# Patient Record
Sex: FEMALE | HISPANIC OR LATINO | Employment: FULL TIME | URBAN - METROPOLITAN AREA
[De-identification: names, ages, dates, MRNs, and addresses within clinical notes are randomized per-mention and may not be internally consistent; named-entity substitution may affect disease eponyms.]

---

## 2017-09-14 ENCOUNTER — OFFICE VISIT (OUTPATIENT)
Dept: OBSTETRICS AND GYNECOLOGY | Facility: CLINIC | Age: 47
End: 2017-09-14

## 2017-09-14 VITALS
BODY MASS INDEX: 23.73 KG/M2 | SYSTOLIC BLOOD PRESSURE: 118 MMHG | WEIGHT: 139 LBS | DIASTOLIC BLOOD PRESSURE: 78 MMHG | HEIGHT: 64 IN

## 2017-09-14 DIAGNOSIS — Z00.00 ENCOUNTER FOR ANNUAL GENERAL MEDICAL EXAMINATION WITHOUT ABNORMAL FINDINGS IN ADULT: Primary | ICD-10-CM

## 2017-09-14 LAB
BILIRUB BLD-MCNC: NEGATIVE MG/DL
CLARITY, POC: CLEAR
COLOR UR: YELLOW
GLUCOSE UR STRIP-MCNC: NEGATIVE MG/DL
KETONES UR QL: NEGATIVE
LEUKOCYTE EST, POC: NEGATIVE
NITRITE UR-MCNC: NEGATIVE MG/ML
PH UR: 5 [PH] (ref 5–8)
PROT UR STRIP-MCNC: NEGATIVE MG/DL
RBC # UR STRIP: NEGATIVE /UL
SP GR UR: 1.02 (ref 1–1.03)
UROBILINOGEN UR QL: NORMAL

## 2017-09-14 PROCEDURE — 99396 PREV VISIT EST AGE 40-64: CPT | Performed by: OBSTETRICS & GYNECOLOGY

## 2017-09-14 PROCEDURE — 81002 URINALYSIS NONAUTO W/O SCOPE: CPT | Performed by: OBSTETRICS & GYNECOLOGY

## 2017-09-14 NOTE — PROGRESS NOTES
GYN Annual Exam     CC- Here for annual exam.     Naomy Cagle is a 47 y.o. female who presents for annual well woman exam. S/p endometrial ablation- having very light spotting. Has some cramping 7-10 days.  Patient is sexually active  yes -  . Patient is satisfied with her contraception yes - vasectomy.     OB History      Para Term  AB TAB SAB Ectopic Multiple Living    3 3 3                 Current contraception: vasectomy  History of abnormal Pap smear: no  History of abnormal mammogram: no  Family history of uterine, colon or ovarian cancer: no  Family history of breast cancer: no    Health Maintenance   Topic Date Due   • TDAP/TD VACCINES (1 - Tdap) 1991   • INFLUENZA VACCINE  2017   • LIPID PANEL  2017   • PAP SMEAR  2017       Past Medical History:   Diagnosis Date   • Anxiety    • Elevated cholesterol    • Hypertension        Past Surgical History:   Procedure Laterality Date   • CHOLECYSTECTOMY     • FOOT SURGERY Bilateral          Current Outpatient Prescriptions:   •  Amphetamine-Dextroamphetamine (ADDERALL PO), Take  by mouth., Disp: , Rfl:   •  Atorvastatin Calcium (LIPITOR PO), Take  by mouth., Disp: , Rfl:   •  Losartan Potassium-HCTZ (HYZAAR PO), Take  by mouth., Disp: , Rfl:   •  sertraline (ZOLOFT) 50 MG tablet, Take 50 mg by mouth Daily., Disp: , Rfl:     Allergies   Allergen Reactions   • Sulfa Antibiotics    • Erythromycin Rash       Social History   Substance Use Topics   • Smoking status: Never Smoker   • Smokeless tobacco: None   • Alcohol use Yes      Comment: occasional        Family History   Problem Relation Age of Onset   • Breast cancer Maternal Grandmother    • Breast cancer Paternal Aunt    • No Known Problems Mother    • No Known Problems Father        Review of Systems   Constitutional: Negative for unexpected weight change.   Gastrointestinal: Negative for constipation and diarrhea.   Genitourinary: Negative for dyspareunia,  "menstrual problem, pelvic pain and vaginal bleeding.       /78  Ht 64\" (162.6 cm)  Wt 139 lb (63 kg)  BMI 23.86 kg/m2    Physical Exam   Constitutional: She is oriented to person, place, and time. She appears well-developed and well-nourished.   HENT:   Mouth/Throat: Normal dentition. No dental caries.   Cardiovascular: Normal rate and regular rhythm.    Pulmonary/Chest: Effort normal and breath sounds normal. Right breast exhibits no inverted nipple, no mass, no nipple discharge, no skin change and no tenderness. Left breast exhibits no inverted nipple, no mass, no nipple discharge, no skin change and no tenderness.   Abdominal: Soft. She exhibits no distension and no mass. There is no tenderness.   Genitourinary: There is no rash, tenderness or lesion on the right labia. There is no rash, tenderness or lesion on the left labia. Uterus is not deviated, not enlarged, not fixed and not tender. Cervix exhibits no motion tenderness, no discharge and no friability. Right adnexum displays no mass, no tenderness and no fullness. Left adnexum displays no mass, no tenderness and no fullness. No tenderness or bleeding in the vagina. No vaginal discharge found.   Neurological: She is alert and oriented to person, place, and time.   Psychiatric: She has a normal mood and affect. Her behavior is normal. Judgment and thought content normal.   Vitals reviewed.         Assessment/Plan    1) GYN HM: Check pap smear.  Check MMG. SBE demonstrated and encouraged.  2) STD screening: declines  3) Contraception: Vasectomy  4) Family Planning: Not interested in more children.  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  8) Follow up prn and 1 year       Diagnoses and all orders for this visit:    Encounter for annual general medical examination without abnormal findings in adult  -     POC Urinalysis Dipstick  -     Pap IG, HPV-hr  -    "  Mammo Screening Bilateral With CAD; Future          Kendy Cowart,   9/15/2017  9:20 PM

## 2017-09-18 LAB
CYTOLOGIST CVX/VAG CYTO: NORMAL
CYTOLOGY CVX/VAG DOC THIN PREP: NORMAL
DX ICD CODE: NORMAL
HIV 1 & 2 AB SER-IMP: NORMAL
HPV I/H RISK 1 DNA CVX QL PROBE+SIG AMP: NEGATIVE
OTHER STN SPEC: NORMAL
PATH REPORT.FINAL DX SPEC: NORMAL
STAT OF ADQ CVX/VAG CYTO-IMP: NORMAL

## 2017-09-29 ENCOUNTER — HOSPITAL ENCOUNTER (OUTPATIENT)
Dept: MAMMOGRAPHY | Facility: HOSPITAL | Age: 47
Discharge: HOME | End: 2017-09-29
Attending: OBSTETRICS & GYNECOLOGY | Admitting: OBSTETRICS & GYNECOLOGY

## 2017-09-29 DIAGNOSIS — Z00.00 ENCOUNTER FOR ANNUAL GENERAL MEDICAL EXAMINATION WITHOUT ABNORMAL FINDINGS IN ADULT: ICD-10-CM

## 2017-09-29 PROCEDURE — 77063 BREAST TOMOSYNTHESIS BI: CPT

## 2017-09-29 PROCEDURE — G0202 SCR MAMMO BI INCL CAD: HCPCS

## 2017-10-02 DIAGNOSIS — R92.8 ABNORMAL MAMMOGRAM OF LEFT BREAST: Primary | ICD-10-CM

## 2017-10-09 ENCOUNTER — HOSPITAL ENCOUNTER (OUTPATIENT)
Dept: MAMMOGRAPHY | Facility: HOSPITAL | Age: 47
Discharge: HOME | End: 2017-10-09
Attending: OBSTETRICS & GYNECOLOGY | Admitting: OBSTETRICS & GYNECOLOGY

## 2017-10-09 ENCOUNTER — APPOINTMENT (OUTPATIENT)
Dept: MAMMOGRAPHY | Facility: HOSPITAL | Age: 47
End: 2017-10-09
Attending: OBSTETRICS & GYNECOLOGY

## 2017-10-09 ENCOUNTER — APPOINTMENT (OUTPATIENT)
Dept: ULTRASOUND IMAGING | Facility: HOSPITAL | Age: 47
End: 2017-10-09
Attending: OBSTETRICS & GYNECOLOGY

## 2017-10-09 DIAGNOSIS — R92.8 ABNORMAL MAMMOGRAM OF LEFT BREAST: ICD-10-CM

## 2017-10-09 PROCEDURE — G0279 TOMOSYNTHESIS, MAMMO: HCPCS

## 2017-10-09 PROCEDURE — G0206 DX MAMMO INCL CAD UNI: HCPCS

## 2018-09-05 ENCOUNTER — TELEPHONE (OUTPATIENT)
Dept: OBSTETRICS AND GYNECOLOGY | Facility: CLINIC | Age: 48
End: 2018-09-05

## 2018-09-05 NOTE — TELEPHONE ENCOUNTER
She is having increased pain in her abdominal area. She is scheduled for her annual Oct 25th, do you want me to put her on the US schedule prior to her appt?

## 2018-10-24 ENCOUNTER — TELEPHONE (OUTPATIENT)
Dept: OBSTETRICS AND GYNECOLOGY | Facility: CLINIC | Age: 48
End: 2018-10-24

## 2018-10-24 NOTE — TELEPHONE ENCOUNTER
Pt is on your schedule for tomorrow for an annual and abd pain. Do you want me to add her to the US schedule?

## 2019-01-25 ENCOUNTER — OFFICE VISIT (OUTPATIENT)
Dept: OBSTETRICS AND GYNECOLOGY | Facility: CLINIC | Age: 49
End: 2019-01-25

## 2019-01-25 VITALS
SYSTOLIC BLOOD PRESSURE: 116 MMHG | DIASTOLIC BLOOD PRESSURE: 78 MMHG | HEIGHT: 64 IN | WEIGHT: 144 LBS | BODY MASS INDEX: 24.59 KG/M2

## 2019-01-25 DIAGNOSIS — Z11.51 SPECIAL SCREENING EXAMINATION FOR HUMAN PAPILLOMAVIRUS (HPV): ICD-10-CM

## 2019-01-25 DIAGNOSIS — Z01.419 WELL FEMALE EXAM WITH ROUTINE GYNECOLOGICAL EXAM: Primary | ICD-10-CM

## 2019-01-25 LAB
B-HCG UR QL: NEGATIVE
BILIRUB BLD-MCNC: NEGATIVE MG/DL
CLARITY, POC: CLEAR
COLOR UR: YELLOW
GLUCOSE UR STRIP-MCNC: NEGATIVE MG/DL
INTERNAL NEGATIVE CONTROL: NEGATIVE
INTERNAL POSITIVE CONTROL: POSITIVE
KETONES UR QL: NEGATIVE
LEUKOCYTE EST, POC: NEGATIVE
Lab: NORMAL
NITRITE UR-MCNC: NEGATIVE MG/ML
PH UR: 6 [PH] (ref 5–8)
PROT UR STRIP-MCNC: NEGATIVE MG/DL
RBC # UR STRIP: NEGATIVE /UL
SP GR UR: 1.02 (ref 1–1.03)
UROBILINOGEN UR QL: NORMAL

## 2019-01-25 PROCEDURE — 81002 URINALYSIS NONAUTO W/O SCOPE: CPT | Performed by: OBSTETRICS & GYNECOLOGY

## 2019-01-25 PROCEDURE — 99213 OFFICE O/P EST LOW 20 MIN: CPT | Performed by: OBSTETRICS & GYNECOLOGY

## 2019-01-25 PROCEDURE — 81025 URINE PREGNANCY TEST: CPT | Performed by: OBSTETRICS & GYNECOLOGY

## 2019-01-25 PROCEDURE — 99396 PREV VISIT EST AGE 40-64: CPT | Performed by: OBSTETRICS & GYNECOLOGY

## 2019-01-25 RX ORDER — LOSARTAN POTASSIUM AND HYDROCHLOROTHIAZIDE 12.5; 1 MG/1; MG/1
TABLET ORAL
COMMUNITY
Start: 2018-10-19

## 2019-01-25 RX ORDER — DEXTROAMPHETAMINE SACCHARATE, AMPHETAMINE ASPARTATE MONOHYDRATE, DEXTROAMPHETAMINE SULFATE AND AMPHETAMINE SULFATE 7.5; 7.5; 7.5; 7.5 MG/1; MG/1; MG/1; MG/1
CAPSULE, EXTENDED RELEASE ORAL
Refills: 0 | COMMUNITY
Start: 2019-01-02

## 2019-01-25 NOTE — PROGRESS NOTES
GYN Annual Exam     CC- Here for annual exam.     Naomy Cagle is a 48 y.o. female who presents for annual well woman exam. Pt has a hx of an endometrial ablation 'years' ago. Pt reports that she is having 7-9 days of debilitating pain. Pt describes the pain as cramping and pressure that is really bothering her. She states the symptoms are cyclical and match up to when she is supposed to be on her cycle. Pt does not have any bleeding. Reporting some hot flashes and difficulty sleeping.     OB History      Para Term  AB Living    3 3 3          SAB TAB Ectopic Molar Multiple Live Births                         Current contraception: vasectomy  History of abnormal Pap smear: no  History of abnormal mammogram: no  Family history of uterine, colon or ovarian cancer: no  Family history of breast cancer: no    Health Maintenance   Topic Date Due   • TDAP/TD VACCINES (1 - Tdap) 1991   • LIPID PANEL  2017   • INFLUENZA VACCINE  2018   • ANNUAL PHYSICAL  09/15/2018   • PAP SMEAR  2020       Past Medical History:   Diagnosis Date   • Anxiety    • Elevated cholesterol    • Hypertension        Past Surgical History:   Procedure Laterality Date   • CHOLECYSTECTOMY     • FOOT SURGERY Bilateral          Current Outpatient Medications:   •  amphetamine-dextroamphetamine XR (ADDERALL XR) 30 MG 24 hr capsule, , Disp: , Rfl: 0  •  Atorvastatin Calcium (LIPITOR PO), Take  by mouth., Disp: , Rfl:   •  Losartan Potassium-HCTZ (HYZAAR PO), Take  by mouth., Disp: , Rfl:   •  losartan-hydrochlorothiazide (HYZAAR) 100-12.5 MG per tablet, , Disp: , Rfl:   •  sertraline (ZOLOFT) 50 MG tablet, Take 50 mg by mouth Daily., Disp: , Rfl:     Allergies   Allergen Reactions   • Sulfa Antibiotics    • Erythromycin Rash       Social History     Tobacco Use   • Smoking status: Never Smoker   Substance Use Topics   • Alcohol use: Yes     Comment: occasional    • Drug use: Defer       Family History   Problem  "Relation Age of Onset   • Breast cancer Maternal Grandmother    • Breast cancer Paternal Aunt    • No Known Problems Mother    • No Known Problems Father        Review of Systems   Constitutional: Negative for unexpected weight change.   Gastrointestinal: Negative for abdominal distention, abdominal pain, anal bleeding, blood in stool, constipation and diarrhea.   Genitourinary: Positive for menstrual problem and pelvic pain. Negative for dyspareunia, vaginal bleeding, vaginal discharge and vaginal pain.       /78   Ht 162.6 cm (64.02\")   Wt 65.3 kg (144 lb)   BMI 24.71 kg/m²     Physical Exam   Constitutional: She is oriented to person, place, and time. She appears well-developed and well-nourished.   HENT:   Mouth/Throat: Normal dentition. No dental caries.   Cardiovascular: Normal rate and regular rhythm.   Pulmonary/Chest: Effort normal and breath sounds normal. Right breast exhibits no inverted nipple, no mass, no nipple discharge, no skin change and no tenderness. Left breast exhibits no inverted nipple, no mass, no nipple discharge, no skin change and no tenderness.   Abdominal: Soft. She exhibits no distension and no mass. There is no tenderness.   Genitourinary: There is no rash, tenderness or lesion on the right labia. There is no rash, tenderness or lesion on the left labia. Uterus is not deviated, not enlarged, not fixed and not tender. Cervix exhibits no motion tenderness, no discharge and no friability. Right adnexum displays no mass, no tenderness and no fullness. Left adnexum displays no mass, no tenderness and no fullness. No tenderness or bleeding in the vagina. No vaginal discharge found.   Neurological: She is alert and oriented to person, place, and time.   Psychiatric: She has a normal mood and affect. Her behavior is normal. Judgment and thought content normal.   Vitals reviewed.         Assessment/Plan      1) GYN HM: LPS 9/2017 normal w neg HR HPV. Check pap smear. MMG. SBE " demonstrated and encouraged.  2) Cyclical pelvic pain: hx of endometrial ablation. Debilitating pain 7-9 days per month. No VB associated with these episodes. Discussed a trial of Lo loestrin to see if this helps with her pain. PE unremarkable. Pt will fu in 2 months. If pain is not improved/resolved then will proceed with TVUS.  3) Hot flashes/difficulty sleeping: ? Perimenopause. Starting Lo loestrin to help with her pelvic pain. Discussed that the med may help with her hot flashes and sleeping as well. Will fu in 2 months to evaluate sx.  4) Contraception: Vasectomy  5) Family Planning: Not interested in more children.  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   9) Follow up 2 months        Diagnoses and all orders for this visit:    Well female exam with routine gynecological exam  -     POC Urinalysis Dipstick  -     POC Pregnancy, Urine  -     Pap IG, HPV-hr  -     Mammo Screening Bilateral With CAD; Future    Special screening examination for human papillomavirus (HPV)  -     Pap IG, HPV-hr    Other orders  -     amphetamine-dextroamphetamine XR (ADDERALL XR) 30 MG 24 hr capsule; Earliest Fill Date: 1/2/19   -     losartan-hydrochlorothiazide (HYZAAR) 100-12.5 MG per tablet;           Kendy Cowart DO  1/25/2019  8:45 AM

## 2019-01-30 ENCOUNTER — APPOINTMENT (OUTPATIENT)
Dept: MAMMOGRAPHY | Facility: HOSPITAL | Age: 49
End: 2019-01-30
Attending: OBSTETRICS & GYNECOLOGY

## 2019-02-06 ENCOUNTER — APPOINTMENT (OUTPATIENT)
Dept: MAMMOGRAPHY | Facility: HOSPITAL | Age: 49
End: 2019-02-06
Attending: OBSTETRICS & GYNECOLOGY

## 2019-02-15 ENCOUNTER — HOSPITAL ENCOUNTER (OUTPATIENT)
Dept: MAMMOGRAPHY | Facility: HOSPITAL | Age: 49
Discharge: HOME | End: 2019-02-15
Attending: OBSTETRICS & GYNECOLOGY | Admitting: OBSTETRICS & GYNECOLOGY

## 2019-02-15 DIAGNOSIS — Z01.419 WELL FEMALE EXAM WITH ROUTINE GYNECOLOGICAL EXAM: ICD-10-CM

## 2019-02-15 PROCEDURE — 77067 SCR MAMMO BI INCL CAD: CPT

## 2019-02-15 PROCEDURE — 77063 BREAST TOMOSYNTHESIS BI: CPT

## 2021-05-14 ENCOUNTER — IMMUNIZATION (OUTPATIENT)
Dept: VACCINE CLINIC | Facility: HOSPITAL | Age: 51
End: 2021-05-14

## 2021-05-14 PROCEDURE — 0001A: CPT | Performed by: OBSTETRICS & GYNECOLOGY

## 2021-05-14 PROCEDURE — 91300 HC SARSCOV02 VAC 30MCG/0.3ML IM: CPT | Performed by: OBSTETRICS & GYNECOLOGY

## 2021-06-04 ENCOUNTER — APPOINTMENT (OUTPATIENT)
Dept: VACCINE CLINIC | Facility: HOSPITAL | Age: 51
End: 2021-06-04

## 2021-06-10 ENCOUNTER — IMMUNIZATION (OUTPATIENT)
Dept: VACCINE CLINIC | Facility: HOSPITAL | Age: 51
End: 2021-06-10

## 2021-06-10 PROCEDURE — 91300 HC SARSCOV02 VAC 30MCG/0.3ML IM: CPT | Performed by: INTERNAL MEDICINE

## 2021-06-10 PROCEDURE — 0002A: CPT | Performed by: INTERNAL MEDICINE

## 2021-07-02 ENCOUNTER — TRANSCRIBE ORDERS (OUTPATIENT)
Dept: ADMINISTRATIVE | Facility: HOSPITAL | Age: 51
End: 2021-07-02

## 2021-07-02 DIAGNOSIS — Z92.89 HISTORY OF MAMMOGRAPHY, SCREENING: Primary | ICD-10-CM

## 2021-07-23 ENCOUNTER — APPOINTMENT (OUTPATIENT)
Dept: MAMMOGRAPHY | Facility: HOSPITAL | Age: 51
End: 2021-07-23

## 2022-12-22 ENCOUNTER — OFFICE VISIT (OUTPATIENT)
Dept: OBSTETRICS AND GYNECOLOGY | Facility: CLINIC | Age: 52
End: 2022-12-22

## 2022-12-22 VITALS
HEIGHT: 64 IN | WEIGHT: 129 LBS | BODY MASS INDEX: 22.02 KG/M2 | SYSTOLIC BLOOD PRESSURE: 120 MMHG | DIASTOLIC BLOOD PRESSURE: 72 MMHG

## 2022-12-22 DIAGNOSIS — Z01.419 ROUTINE GYNECOLOGICAL EXAMINATION: Primary | ICD-10-CM

## 2022-12-22 DIAGNOSIS — Z11.51 SPECIAL SCREENING EXAMINATION FOR HUMAN PAPILLOMAVIRUS (HPV): ICD-10-CM

## 2022-12-22 DIAGNOSIS — Z01.419 PAP SMEAR, LOW-RISK: ICD-10-CM

## 2022-12-22 PROCEDURE — 99386 PREV VISIT NEW AGE 40-64: CPT | Performed by: OBSTETRICS & GYNECOLOGY

## 2022-12-22 PROCEDURE — 81002 URINALYSIS NONAUTO W/O SCOPE: CPT | Performed by: OBSTETRICS & GYNECOLOGY

## 2022-12-22 RX ORDER — DICYCLOMINE HYDROCHLORIDE 10 MG/1
CAPSULE ORAL
COMMUNITY
Start: 2022-12-05

## 2022-12-22 NOTE — PROGRESS NOTES
GYN Annual Exam     CC- Here for annual exam.     Naomy Cagle is a 52 y.o. female who presents for annual well woman exam. Pt is considered a new pt bc she has not been seen in over 3 years. Last was in 2019. Pt has a hx of an endometrial ablation 'years' ago. At last visit she was reporting 7-9 days of debilitating pain that was cyclical. She states the symptoms have improved. She is having some intermittent vasomotor symptoms. Pt is reporting some mood swings and difficulty with sleep.    OB History        3    Para   3    Term   3            AB        Living           SAB        IAB        Ectopic        Molar        Multiple        Live Births                    Current contraception: vasectomy  History of abnormal Pap smear: no  History of abnormal mammogram: no  Family history of uterine, colon or ovarian cancer: no  Family history of breast cancer: no    Health Maintenance   Topic Date Due   • COLORECTAL CANCER SCREENING  Never done   • HEPATITIS C SCREENING  Never done   • ANNUAL PHYSICAL  2018   • Annual Gynecologic Pelvic and Breast Exam  2020   • COVID-19 Vaccine (3 - Booster for Pfizer series) 2021   • PAP SMEAR  2022   • ZOSTER VACCINE (1 of 2) Never done   • INFLUENZA VACCINE  2022   • LIPID PANEL  2023   • MAMMOGRAM  2024   • TDAP/TD VACCINES (2 - Td or Tdap) 2029   • Pneumococcal Vaccine 0-64  Aged Out       Past Medical History:   Diagnosis Date   • Anxiety    • Elevated cholesterol    • Hypertension        Past Surgical History:   Procedure Laterality Date   • CHOLECYSTECTOMY     • FOOT SURGERY Bilateral          Current Outpatient Medications:   •  amphetamine-dextroamphetamine XR (ADDERALL XR) 30 MG 24 hr capsule, , Disp: , Rfl: 0  •  Atorvastatin Calcium (LIPITOR PO), Take  by mouth., Disp: , Rfl:   •  CBD (cannabidiol) oral oil, Take  by mouth., Disp: , Rfl:   •  Multiple Vitamins-Minerals (Multivitamin Adult, Minerals,) tablet,  "Take 1 tablet by mouth Daily., Disp: , Rfl:   •  sertraline (ZOLOFT) 50 MG tablet, Take 50 mg by mouth Daily., Disp: , Rfl:   •  dicyclomine (BENTYL) 10 MG capsule, TAKE 1 CAPSULE BY MOUTH FOUR TIMES DAILY BEFORE MEALS AND AT NIGHT, Disp: , Rfl:   •  Losartan Potassium-HCTZ (HYZAAR PO), Take  by mouth., Disp: , Rfl:   •  losartan-hydrochlorothiazide (HYZAAR) 100-12.5 MG per tablet, , Disp: , Rfl:     Allergies   Allergen Reactions   • Erythromycin Rash   • Sulfa Antibiotics Rash       Social History     Tobacco Use   • Smoking status: Never   Substance Use Topics   • Alcohol use: Yes     Comment: occasional    • Drug use: Defer       Family History   Problem Relation Age of Onset   • Breast cancer Maternal Grandmother    • Breast cancer Paternal Aunt    • No Known Problems Mother    • No Known Problems Father        Review of Systems   Constitutional: Negative for unexpected weight change.   Gastrointestinal: Negative for abdominal distention, abdominal pain, anal bleeding, blood in stool, constipation and diarrhea.   Genitourinary: Negative for dyspareunia, menstrual problem, pelvic pain, vaginal bleeding, vaginal discharge and vaginal pain.       /72   Ht 162.6 cm (64\")   Wt 58.5 kg (129 lb)   BMI 22.14 kg/m²     Physical Exam   Constitutional: She is oriented to person, place, and time. She appears well-developed.   HENT:   Mouth/Throat: Normal dentition. No dental caries.   Cardiovascular: Normal rate and regular rhythm.   Pulmonary/Chest: Effort normal and breath sounds normal. Right breast exhibits no inverted nipple, no mass, no nipple discharge, no skin change and no tenderness. Left breast exhibits no inverted nipple, no mass, no nipple discharge, no skin change and no tenderness.   Abdominal: Soft. She exhibits no distension and no mass. There is no abdominal tenderness.   Genitourinary: There is no rash, tenderness or lesion on the right labia. There is no rash, tenderness or lesion on the left " labia. Uterus is not deviated, not enlarged, not fixed and not tender. Cervix exhibits no motion tenderness, no discharge and no friability. Right adnexum displays no mass, no tenderness and no fullness. Left adnexum displays no mass, no tenderness and no fullness.    No vaginal discharge, tenderness or bleeding.   No tenderness or bleeding in the vagina.   Neurological: She is alert and oriented to person, place, and time.   Psychiatric: Her behavior is normal. Judgment and thought content normal.   Vitals reviewed.         Assessment/Plan      1) GYN HM: LPS 2019 normal w neg HR HPV. Check pap smear. MMG 6/2022. SBE demonstrated and encouraged.  2) Suspected perimenopause: Pt declines meds but would consider Prometrium if symptoms worsen.  4) Contraception: Vasectomy  5) Family Planning: Not interested in more children.  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   9) Follow up 1 year/prn        Diagnoses and all orders for this visit:    Routine gynecological examination  -     POC Urinalysis Dipstick  -     IGP, Apt HPV,rfx 16 / 18,45    Special screening examination for human papillomavirus (HPV)  -     IGP, Apt HPV,rfx 16 / 18,45    Pap smear, low-risk  -     IGP, Apt HPV,rfx 16 / 18,45    Other orders  -     dicyclomine (BENTYL) 10 MG capsule; TAKE 1 CAPSULE BY MOUTH FOUR TIMES DAILY BEFORE MEALS AND AT NIGHT  -     Multiple Vitamins-Minerals (Multivitamin Adult, Minerals,) tablet; Take 1 tablet by mouth Daily.  -     CBD (cannabidiol) oral oil; Take  by mouth.          Kendy Cowart,   12/22/2022  13:04 EST

## 2023-01-03 LAB
CYTOLOGIST CVX/VAG CYTO: NORMAL
CYTOLOGY CVX/VAG DOC CYTO: NORMAL
CYTOLOGY CVX/VAG DOC THIN PREP: NORMAL
DX ICD CODE: NORMAL
HIV 1 & 2 AB SER-IMP: NORMAL
HPV I/H RISK 4 DNA CVX QL PROBE+SIG AMP: NEGATIVE
OTHER STN SPEC: NORMAL
STAT OF ADQ CVX/VAG CYTO-IMP: NORMAL

## 2024-01-04 ENCOUNTER — TELEPHONE (OUTPATIENT)
Dept: OBSTETRICS AND GYNECOLOGY | Facility: CLINIC | Age: 54
End: 2024-01-04

## 2024-01-04 NOTE — TELEPHONE ENCOUNTER
PROVIDER: ARIA BARRIENTOS    CALLER: OMID SHAIKH    SAME DAY CANCEL, DAUGHTER TESTED POSITIVE FOR COVID YESTERDAY AND PT IS STARTING TO HAVE SYMPTOMS

## 2024-04-30 ENCOUNTER — TELEPHONE (OUTPATIENT)
Dept: OBSTETRICS AND GYNECOLOGY | Facility: CLINIC | Age: 54
End: 2024-04-30
Payer: COMMERCIAL

## 2024-04-30 NOTE — TELEPHONE ENCOUNTER
Called patient to see if she would like to r/s her canceled appointment from 5/3. Patient did not answer, M 4/30

## 2024-06-10 ENCOUNTER — OFFICE VISIT (OUTPATIENT)
Dept: OBSTETRICS AND GYNECOLOGY | Facility: CLINIC | Age: 54
End: 2024-06-10
Payer: COMMERCIAL

## 2024-06-10 VITALS
WEIGHT: 137 LBS | SYSTOLIC BLOOD PRESSURE: 130 MMHG | DIASTOLIC BLOOD PRESSURE: 82 MMHG | BODY MASS INDEX: 23.39 KG/M2 | HEIGHT: 64 IN

## 2024-06-10 DIAGNOSIS — Z12.31 ENCOUNTER FOR SCREENING MAMMOGRAM FOR MALIGNANT NEOPLASM OF BREAST: ICD-10-CM

## 2024-06-10 DIAGNOSIS — Z01.419 CERVICAL SMEAR, AS PART OF ROUTINE GYNECOLOGICAL EXAMINATION: Primary | ICD-10-CM

## 2024-06-10 DIAGNOSIS — Z01.419 ROUTINE GYNECOLOGICAL EXAMINATION: ICD-10-CM

## 2024-06-10 DIAGNOSIS — Z11.51 SPECIAL SCREENING EXAMINATION FOR HUMAN PAPILLOMAVIRUS (HPV): ICD-10-CM

## 2024-06-10 LAB
BILIRUB BLD-MCNC: NEGATIVE MG/DL
CLARITY, POC: CLEAR
COLOR UR: YELLOW
GLUCOSE UR STRIP-MCNC: NEGATIVE MG/DL
KETONES UR QL: NEGATIVE
LEUKOCYTE EST, POC: NEGATIVE
NITRITE UR-MCNC: NEGATIVE MG/ML
PH UR: 5 [PH] (ref 5–8)
PROT UR STRIP-MCNC: NEGATIVE MG/DL
RBC # UR STRIP: NEGATIVE /UL
SP GR UR: 1 (ref 1–1.03)
UROBILINOGEN UR QL: NORMAL

## 2024-06-10 PROCEDURE — 99396 PREV VISIT EST AGE 40-64: CPT | Performed by: NURSE PRACTITIONER

## 2024-06-10 PROCEDURE — 81002 URINALYSIS NONAUTO W/O SCOPE: CPT | Performed by: NURSE PRACTITIONER

## 2024-06-10 RX ORDER — ATORVASTATIN CALCIUM 20 MG/1
1 TABLET, FILM COATED ORAL DAILY
COMMUNITY
Start: 2024-05-06

## 2024-06-10 RX ORDER — POLYETHYLENE GLYCOL 3350, SODIUM CHLORIDE, SODIUM BICARBONATE, POTASSIUM CHLORIDE 420; 11.2; 5.72; 1.48 G/4L; G/4L; G/4L; G/4L
POWDER, FOR SOLUTION ORAL
COMMUNITY
Start: 2024-05-09

## 2024-06-10 NOTE — PROGRESS NOTES
"GYN Annual Exam     CC- Here for annual exam.     Naomy Cagle is a 54 y.o. female who presents for annual well woman exam. She is an established patient. Pt has a hx of an endometrial ablation 'years' ago.  She has not had any cycles since her ablation. She has hot flashes for approx 3-4 months. Reports right now her hot flashes are manageable at this time. She also c/o weight gain. She states \" I have gained 10# since February or March or something like that.\"     She does not do SBE. She is sexually active. She also c/o decreased libido- pt has not desires to have sex. She is happy in her relationship. She denies pain with sex.       OB History          3    Para   3    Term   3            AB        Living             SAB        IAB        Ectopic        Molar        Multiple        Live Births                  Last pap:  NIL   MM/22  Colonoscopy: upcoming  Current contraception: vasectomy  History of abnormal Pap smear: no  History of abnormal mammogram: no  Family history of uterine, colon or ovarian cancer: no  Family history of breast cancer: no    Health Maintenance   Topic Date Due    COLORECTAL CANCER SCREENING  Never done    HEPATITIS C SCREENING  Never done    ANNUAL PHYSICAL  2018    ZOSTER VACCINE (1 of 2) Never done    COVID-19 Vaccine (3 - 2023-24 season) 2023    Annual Gynecologic Pelvic and Breast Exam  2023    MAMMOGRAM  2024    INFLUENZA VACCINE  2024    LIPID PANEL  2024    PAP SMEAR  2025    TDAP/TD VACCINES (2 - Td or Tdap) 2029    Pneumococcal Vaccine 0-64  Aged Out       Past Medical History:   Diagnosis Date    Anxiety     Elevated cholesterol     Hypertension        Past Surgical History:   Procedure Laterality Date    CHOLECYSTECTOMY      FOOT SURGERY Bilateral          Current Outpatient Medications:     atorvastatin (LIPITOR) 20 MG tablet, Take 1 tablet by mouth Daily., Disp: , Rfl:     Multiple Vitamins-Minerals " "(Multivitamin Adult, Minerals,) tablet, Take 1 tablet by mouth Daily., Disp: , Rfl:     polyethylene glycol-electrolytes (NULYTELY) 420 g solution, , Disp: , Rfl:     sertraline (ZOLOFT) 50 MG tablet, Take 1 tablet by mouth Daily., Disp: , Rfl:     CBD (cannabidiol) oral oil, Take  by mouth. (Patient not taking: Reported on 6/10/2024), Disp: , Rfl:     dicyclomine (BENTYL) 10 MG capsule, TAKE 1 CAPSULE BY MOUTH FOUR TIMES DAILY BEFORE MEALS AND AT NIGHT (Patient not taking: Reported on 6/10/2024), Disp: , Rfl:     Losartan Potassium-HCTZ (HYZAAR PO), Take  by mouth. (Patient not taking: Reported on 6/10/2024), Disp: , Rfl:     losartan-hydrochlorothiazide (HYZAAR) 100-12.5 MG per tablet, , Disp: , Rfl:     Allergies   Allergen Reactions    Erythromycin Rash    Sulfa Antibiotics Rash       Social History     Tobacco Use    Smoking status: Never   Substance Use Topics    Alcohol use: Yes     Comment: occasional     Drug use: Defer       Family History   Problem Relation Age of Onset    Breast cancer Maternal Grandmother     Breast cancer Paternal Aunt     No Known Problems Mother     No Known Problems Father        Review of Systems   Constitutional:  Positive for unexpected weight change.   Gastrointestinal:  Negative for abdominal distention, abdominal pain, anal bleeding, blood in stool, constipation and diarrhea.   Endocrine: Positive for heat intolerance.   Genitourinary:  Negative for dyspareunia, menstrual problem, pelvic pain, vaginal bleeding, vaginal discharge and vaginal pain.        Decreased libido        /82   Ht 162.6 cm (64\")   Wt 62.1 kg (137 lb)   LMP  (LMP Unknown)   Breastfeeding No   BMI 23.52 kg/m²     Physical Exam   Constitutional: She is oriented to person, place, and time. She appears well-developed.   HENT:   Mouth/Throat: Normal dentition. No dental caries.   Cardiovascular: Normal rate and regular rhythm.   Pulmonary/Chest: Effort normal and breath sounds normal. Right breast " exhibits no inverted nipple, no mass, no nipple discharge, no skin change and no tenderness. Left breast exhibits no inverted nipple, no mass, no nipple discharge, no skin change and no tenderness.   Abdominal: Soft. She exhibits no distension and no mass. There is no abdominal tenderness.   Genitourinary: There is no rash, tenderness or lesion on the right labia. There is no rash, tenderness or lesion on the left labia. Uterus is not deviated, not enlarged, not fixed and not tender. Cervix exhibits no motion tenderness, no discharge and no friability. Right adnexum displays no mass, no tenderness and no fullness. Left adnexum displays no mass, no tenderness and no fullness.    No vaginal discharge, tenderness or bleeding.   No tenderness or bleeding in the vagina.   Neurological: She is alert and oriented to person, place, and time.   Psychiatric: Her behavior is normal. Judgment and thought content normal.   Vitals reviewed.         Assessment/Plan      1) GYN HM: Pap collected. Enc SBE monthly. Schedule screening MMG.   2) Menopause: Has hot flashes. Her cramping is better so she thinks she is not cycling. Disc management of symptoms. Declines RX. Info provided on alternative management options.   4) Contraception: Vasectomy   5) colonoscopy: Scheduled for Wednesday .  6) Body mass index is 23.52 kg/m². 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   9) Decreased libido- Declines pain, stress, relationship issues, etc. Info provided on decreased libido. Offered ref to sex therapist- pt declined at this time.     Parts of this document have been copied or forwarded from her previous visits and have been reviewed, updated and edited as indicated.     Diagnoses and all orders for this visit:    Cervical smear, as part of routine gynecological examination  -     POC Urinalysis Dipstick  -     IGP, Apt  HPV,rfx 16 / 18,45    Routine gynecological examination  -     POC Urinalysis Dipstick  -     IGP, Apt HPV,rfx 16 / 18,45    Special screening examination for human papillomavirus (HPV)  -     POC Urinalysis Dipstick  -     IGP, Apt HPV,rfx 16 / 18,45    Encounter for screening mammogram for malignant neoplasm of breast  -     Mammo Screening Digital Tomosynthesis Bilateral With CAD; Future    Other orders  -     polyethylene glycol-electrolytes (NULYTELY) 420 g solution  -     atorvastatin (LIPITOR) 20 MG tablet; Take 1 tablet by mouth Daily.      RTO PRN and 1 year AE     Philomena Velasquez, APRN  6/10/2024  15:33 EDT

## 2024-06-12 LAB
CYTOLOGIST CVX/VAG CYTO: NORMAL
CYTOLOGY CVX/VAG DOC CYTO: NORMAL
CYTOLOGY CVX/VAG DOC THIN PREP: NORMAL
DX ICD CODE: NORMAL
HPV I/H RISK 4 DNA CVX QL PROBE+SIG AMP: NEGATIVE
Lab: NORMAL
OTHER STN SPEC: NORMAL
STAT OF ADQ CVX/VAG CYTO-IMP: NORMAL

## 2024-07-19 ENCOUNTER — HOSPITAL ENCOUNTER (OUTPATIENT)
Dept: MAMMOGRAPHY | Facility: HOSPITAL | Age: 54
Discharge: HOME | End: 2024-07-19
Admitting: NURSE PRACTITIONER
Payer: COMMERCIAL

## 2024-07-19 DIAGNOSIS — Z12.31 ENCOUNTER FOR SCREENING MAMMOGRAM FOR MALIGNANT NEOPLASM OF BREAST: ICD-10-CM

## 2024-07-19 PROCEDURE — 77063 BREAST TOMOSYNTHESIS BI: CPT

## 2024-07-19 PROCEDURE — 77067 SCR MAMMO BI INCL CAD: CPT

## 2025-06-11 ENCOUNTER — OFFICE VISIT (OUTPATIENT)
Dept: OBSTETRICS AND GYNECOLOGY | Facility: CLINIC | Age: 55
End: 2025-06-11
Payer: COMMERCIAL

## 2025-06-11 VITALS — HEIGHT: 64 IN | WEIGHT: 140 LBS | BODY MASS INDEX: 23.9 KG/M2

## 2025-06-11 DIAGNOSIS — R31.9 HEMATURIA, UNSPECIFIED TYPE: ICD-10-CM

## 2025-06-11 DIAGNOSIS — Z01.419 CERVICAL SMEAR, AS PART OF ROUTINE GYNECOLOGICAL EXAMINATION: ICD-10-CM

## 2025-06-11 DIAGNOSIS — Z11.51 SPECIAL SCREENING EXAMINATION FOR HUMAN PAPILLOMAVIRUS (HPV): ICD-10-CM

## 2025-06-11 DIAGNOSIS — Z01.419 ROUTINE GYNECOLOGICAL EXAMINATION: ICD-10-CM

## 2025-06-11 LAB
BILIRUB BLD-MCNC: NEGATIVE MG/DL
CLARITY, POC: CLEAR
COLOR UR: YELLOW
GLUCOSE UR STRIP-MCNC: NEGATIVE MG/DL
KETONES UR QL: NEGATIVE
LEUKOCYTE EST, POC: NEGATIVE
NITRITE UR-MCNC: NEGATIVE MG/ML
PH UR: 5 [PH] (ref 5–8)
PROT UR STRIP-MCNC: NEGATIVE MG/DL
RBC # UR STRIP: ABNORMAL /UL
SP GR UR: 1 (ref 1–1.03)
UROBILINOGEN UR QL: NORMAL

## 2025-06-11 RX ORDER — CYCLOBENZAPRINE HCL 10 MG
10 TABLET ORAL
COMMUNITY

## 2025-06-11 RX ORDER — NAPROXEN 500 MG/1
500 TABLET ORAL 2 TIMES DAILY WITH MEALS
COMMUNITY
End: 2025-06-11

## 2025-06-11 RX ORDER — LIDOCAINE 50 MG/G
PATCH TOPICAL
COMMUNITY
End: 2025-06-11

## 2025-06-11 RX ORDER — LISDEXAMFETAMINE DIMESYLATE 20 MG/1
20 CAPSULE ORAL DAILY
COMMUNITY
Start: 2025-01-01

## 2025-06-11 RX ORDER — CLOTRIMAZOLE 1 G/ML
SOLUTION TOPICAL
COMMUNITY
Start: 2025-02-19 | End: 2025-06-11

## 2025-06-11 RX ORDER — METHOCARBAMOL 750 MG/1
TABLET, FILM COATED ORAL
COMMUNITY
Start: 2025-03-13 | End: 2025-06-11

## 2025-06-11 NOTE — PROGRESS NOTES
GYN Annual Exam     CC- Here for annual exam.     Naomy Cagle is a 55 y.o. female who presents for annual well woman exam. She is an established patient. Pt has a hx of an endometrial ablation 'years' ago.  She has not had any cycles since her ablation.  Denies VB. No HRT.     She does do SBE occasionally.     She is sexually active. She continues to have decreased libido.     She denies changes in her medical and surgical history.       OB History          3    Para   3    Term   3            AB        Living             SAB        IAB        Ectopic        Molar        Multiple        Live Births                  Last pap:  NIL   MM/24 normal   Colonoscopy: - Repeat in 5 years per patient.   Current contraception: vasectomy  History of abnormal Pap smear: no  History of abnormal mammogram: no  Family history of uterine, colon or ovarian cancer: no  Family history of breast cancer: no    Health Maintenance   Topic Date Due    COLORECTAL CANCER SCREENING  Never done    HEPATITIS C SCREENING  Never done    ANNUAL PHYSICAL  2018    Pneumococcal Vaccine 50+ (2 of 2 - PCV) 2022    ZOSTER VACCINE (1 of 2) Never done    COVID-19 Vaccine (3 -  season) 2024    Annual Gynecologic Pelvic and Breast Exam  2025    INFLUENZA VACCINE  2025    MAMMOGRAM  2026    PAP SMEAR  06/10/2027    TDAP/TD VACCINES (2 - Td or Tdap) 2029       Past Medical History:   Diagnosis Date    Anxiety     Elevated cholesterol     Hypertension        Past Surgical History:   Procedure Laterality Date    CHOLECYSTECTOMY      FOOT SURGERY Bilateral          Current Outpatient Medications:     atorvastatin (LIPITOR) 20 MG tablet, Take 1 tablet by mouth Daily., Disp: , Rfl:     Collagen-Vitamin C-Biotin (COLLAGEN PO), Take  by mouth., Disp: , Rfl:     cyclobenzaprine (FLEXERIL) 10 MG tablet, Take 1 tablet by mouth. (Patient taking differently: Take 1 tablet by mouth Daily As  "Needed.), Disp: , Rfl:     lisdexamfetamine (VYVANSE) 20 MG capsule, Take 1 capsule by mouth Daily (Patient taking differently: Take 1 capsule by mouth Daily As Needed), Disp: , Rfl:     Probiotic Product (PROBIOTIC BLEND PO), Take  by mouth., Disp: , Rfl:     Allergies   Allergen Reactions    Erythromycin Rash    Sulfa Antibiotics Rash       Social History     Tobacco Use    Smoking status: Never   Substance Use Topics    Alcohol use: Yes     Comment: occasional     Drug use: Defer       Family History   Problem Relation Age of Onset    Breast cancer Maternal Grandmother     Breast cancer Paternal Aunt     No Known Problems Mother     No Known Problems Father        Review of Systems   Gastrointestinal:  Negative for abdominal distention, abdominal pain, anal bleeding, blood in stool, constipation and diarrhea.   Genitourinary:  Negative for dyspareunia, menstrual problem, pelvic pain, vaginal bleeding, vaginal discharge and vaginal pain.       Ht 162.6 cm (64\")   Wt 63.5 kg (140 lb)   BMI 24.03 kg/m²     Physical Exam   Constitutional: She is oriented to person, place, and time. She appears well-developed.   HENT: Mouth/Throat: Normal dentition. No dental caries.   Cardiovascular: Normal rate and regular rhythm.   Pulmonary/Chest: Effort normal and breath sounds normal. Right breast exhibits no inverted nipple, no mass, no nipple discharge, no skin change and no tenderness. Left breast exhibits no inverted nipple, no mass, no nipple discharge, no skin change and no tenderness.   Abdominal: Soft. Normal appearance. She exhibits no distension and no mass.   Genitourinary: There is no rash, tenderness or lesion on the right labia. There is no rash, tenderness or lesion on the left labia. Uterus is not deviated, not enlarged, not fixed and not tender. Cervix exhibits no motion tenderness, no discharge and no friability. Right adnexum displays no mass, no tenderness and no fullness. Left adnexum displays no mass, no " tenderness and no fullness.    No vaginal discharge, tenderness or bleeding.   No tenderness or bleeding in the vagina.   Musculoskeletal: Normal range of motion.   Neurological: She is alert and oriented to person, place, and time.   Skin: Skin is warm and dry.   Psychiatric: Her behavior is normal. Mood normal.   Nursing note and vitals reviewed.         Assessment/Plan      1) GYN HM: Pap collected- Yes. Enc SBE monthly. Schedule screening MMG.   2) Menopause: Doing well, denies c/o.Disc herbal supplements that could possible help with decreased libido.   3) Contraception: Vasectomy   4) colonoscopy: UTD.  5) Body mass index is 24.03 kg/m². Diet and Exercise discussed  6) Smoking Status: nonsmoker  7) Hematuria- Check urine cx.       Parts of this document have been copied or forwarded from her previous visits and have been reviewed, updated and edited as indicated.     Diagnoses and all orders for this visit:    Routine gynecological examination  -     POC Urinalysis Dipstick    Other orders  -     Discontinue: clotrimazole (LOTRIMIN) 1 % external solution; 5 drops TID to right ear  -     cyclobenzaprine (FLEXERIL) 10 MG tablet; Take 1 tablet by mouth. (Patient taking differently: Take 1 tablet by mouth Daily As Needed.)  -     Discontinue: lidocaine (LIDODERM) 5 %; PLACE 1 PATCH ONTO THE SKIN EVERY DAY. REMOVE AND DISCARD PATCH WITHIN 12 HOURS OR AS DIRECTED BY MD.  -     lisdexamfetamine (VYVANSE) 20 MG capsule; Take 1 capsule by mouth Daily (Patient taking differently: Take 1 capsule by mouth Daily As Needed)  -     Discontinue: methocarbamol (ROBAXIN) 750 MG tablet; TAKE 1 TABLET BY MOUTH FOUR TIMES DAILY FOR 7 DAYS  -     Discontinue: naproxen (NAPROSYN) 500 MG tablet; Take 1 tablet by mouth 2 (Two) Times a Day With Meals.  -     Collagen-Vitamin C-Biotin (COLLAGEN PO); Take  by mouth.  -     Probiotic Product (PROBIOTIC BLEND PO); Take  by mouth.      RTO PRN and 1 year AE     Philomena Velasquez,  APRN  6/11/2025  10:13 EDT

## 2025-06-12 LAB
BACTERIA UR CULT: NORMAL
BACTERIA UR CULT: NORMAL

## 2025-06-13 LAB
CYTOLOGIST CVX/VAG CYTO: NORMAL
CYTOLOGY CVX/VAG DOC CYTO: NORMAL
CYTOLOGY CVX/VAG DOC THIN PREP: NORMAL
DX ICD CODE: NORMAL
HPV I/H RISK 4 DNA CVX QL PROBE+SIG AMP: NEGATIVE
OTHER STN SPEC: NORMAL
SERVICE CMNT-IMP: NORMAL
STAT OF ADQ CVX/VAG CYTO-IMP: NORMAL